# Patient Record
Sex: MALE | Race: WHITE | NOT HISPANIC OR LATINO | Employment: FULL TIME | URBAN - METROPOLITAN AREA
[De-identification: names, ages, dates, MRNs, and addresses within clinical notes are randomized per-mention and may not be internally consistent; named-entity substitution may affect disease eponyms.]

---

## 2017-06-13 ENCOUNTER — OFFICE VISIT (OUTPATIENT)
Dept: INTERNAL MEDICINE | Facility: CLINIC | Age: 22
End: 2017-06-13
Payer: COMMERCIAL

## 2017-06-13 ENCOUNTER — LAB VISIT (OUTPATIENT)
Dept: LAB | Facility: HOSPITAL | Age: 22
End: 2017-06-13
Attending: INTERNAL MEDICINE
Payer: COMMERCIAL

## 2017-06-13 VITALS
HEIGHT: 72 IN | DIASTOLIC BLOOD PRESSURE: 80 MMHG | OXYGEN SATURATION: 99 % | BODY MASS INDEX: 37.97 KG/M2 | HEART RATE: 78 BPM | SYSTOLIC BLOOD PRESSURE: 122 MMHG | TEMPERATURE: 99 F | WEIGHT: 280.31 LBS

## 2017-06-13 DIAGNOSIS — F51.01 PRIMARY INSOMNIA: Primary | ICD-10-CM

## 2017-06-13 DIAGNOSIS — F51.01 PRIMARY INSOMNIA: ICD-10-CM

## 2017-06-13 LAB
BASOPHILS # BLD AUTO: 0.03 K/UL
BASOPHILS NFR BLD: 0.4 %
DIFFERENTIAL METHOD: NORMAL
EOSINOPHIL # BLD AUTO: 0.1 K/UL
EOSINOPHIL NFR BLD: 1.3 %
ERYTHROCYTE [DISTWIDTH] IN BLOOD BY AUTOMATED COUNT: 12.6 %
GLUCOSE SERPL-MCNC: 84 MG/DL
HCT VFR BLD AUTO: 42.3 %
HGB BLD-MCNC: 14.1 G/DL
LYMPHOCYTES # BLD AUTO: 2.2 K/UL
LYMPHOCYTES NFR BLD: 26.2 %
MCH RBC QN AUTO: 30.3 PG
MCHC RBC AUTO-ENTMCNC: 33.3 %
MCV RBC AUTO: 91 FL
MONOCYTES # BLD AUTO: 0.7 K/UL
MONOCYTES NFR BLD: 8 %
NEUTROPHILS # BLD AUTO: 5.3 K/UL
NEUTROPHILS NFR BLD: 64.1 %
PLATELET # BLD AUTO: 189 K/UL
PMV BLD AUTO: 10.3 FL
RBC # BLD AUTO: 4.66 M/UL
T3 SERPL-MCNC: 118 NG/DL
T4 SERPL-MCNC: 5.5 UG/DL
TSH SERPL DL<=0.005 MIU/L-ACNC: 1.44 UIU/ML
WBC # BLD AUTO: 8.29 K/UL

## 2017-06-13 PROCEDURE — 84443 ASSAY THYROID STIM HORMONE: CPT

## 2017-06-13 PROCEDURE — 82947 ASSAY GLUCOSE BLOOD QUANT: CPT | Mod: PO

## 2017-06-13 PROCEDURE — 99203 OFFICE O/P NEW LOW 30 MIN: CPT | Mod: S$GLB,,, | Performed by: PHYSICIAN ASSISTANT

## 2017-06-13 PROCEDURE — 99999 PR PBB SHADOW E&M-EST. PATIENT-LVL III: CPT | Mod: PBBFAC,,, | Performed by: PHYSICIAN ASSISTANT

## 2017-06-13 PROCEDURE — 36415 COLL VENOUS BLD VENIPUNCTURE: CPT | Mod: PO

## 2017-06-13 PROCEDURE — 84436 ASSAY OF TOTAL THYROXINE: CPT

## 2017-06-13 PROCEDURE — 85025 COMPLETE CBC W/AUTO DIFF WBC: CPT | Mod: PO

## 2017-06-13 PROCEDURE — 84480 ASSAY TRIIODOTHYRONINE (T3): CPT

## 2017-06-13 RX ORDER — AMITRIPTYLINE HYDROCHLORIDE 10 MG/1
10 TABLET, FILM COATED ORAL NIGHTLY PRN
Qty: 20 TABLET | Refills: 0 | Status: SHIPPED | OUTPATIENT
Start: 2017-06-13 | End: 2017-07-10 | Stop reason: SDUPTHER

## 2017-06-13 NOTE — PROGRESS NOTES
Subjective:       Patient ID: Gamaliel Recinos is a 22 y.o.W/ male.    Chief Complaint: Insomnia    HPI         He comes in by himself today and has the above problem.  He is new to the practice and has never been here before.  He has been having trouble falling asleep at nighttime now for approximately a month.  He has a job that he has to get to by 7:30 AM.  He tries to get up at 5 AM by alarm clock.  He goes to bed at 9:00 at night.  Often times he just lays there for 1-4 hours before he can fall off to sleep.  He says his mind is racing.  He doesn't think of anyone thing in particular.  He denies any problems with financial, occupational, or family stress.  He still lives at home.  His brother just recently moved back into the home after having been out of state with his wife and child.  That has been little stress only.  There is no noise in the house when he is trying to fall asleep at 9:00 PM.  He was suspended for 2 days at work because he showed up late.  Apparently he's been showing up late a lot.        He has tried taking OTC melatonin and also NyQuil and sometimes Tylenol PM to help him get to sleep.  He does not take daytime naps.    Review of Systems    Otherwise negative concerning the NEUROPSYCHIATRIC, ENDOCRINE, ORTHOPEDIC, MUSCULOSKELETAL, ENT, RESPIRATORY, CV, and VASCULAR system review.    Objective:      Physical Exam    HEAD: NC/AT   He is alert, oriented ×3, cooperative and quite pleasant.  CN II through XII are intact and equal.  Gross motor and neurosensory functions are intact and equal.  ENT: All within normal limits.  He doesn't have any thyromegaly or tenderness.  His voice is normal.  CHEST: Clear BS anterior to posterior.  HEART: S1 is greater than S2.  Pulse rate is 76 bpm and regular.  He has no murmur or gallop.  There is no peripheral edema.  His color and warmth are all normal.  He is well hydrated.    Assessment:       1. Primary insomnia        Plan:     1.  Will order CBC, T3,  T4, TSH, and FBS.  2.  Trial of Elavil 10 mg daily at bedtime to help aid sleep.  Recheck in 3 weeks' time to determine efficacy.

## 2017-06-14 ENCOUNTER — TELEPHONE (OUTPATIENT)
Dept: INTERNAL MEDICINE | Facility: CLINIC | Age: 22
End: 2017-06-14

## 2017-06-14 NOTE — TELEPHONE ENCOUNTER
----- Message from Sangeeta Bradshaw sent at 6/14/2017  2:27 PM CDT -----  Contact: Pt  Pt request call from nurse to get results of labs done on 06-13-17, please contact pt at [phone]

## 2017-06-14 NOTE — TELEPHONE ENCOUNTER
Called and spoke with patient advised as soon as results are reviewed I will call him with them, he agrees to this

## 2017-07-10 ENCOUNTER — OFFICE VISIT (OUTPATIENT)
Dept: INTERNAL MEDICINE | Facility: CLINIC | Age: 22
End: 2017-07-10
Payer: COMMERCIAL

## 2017-07-10 VITALS
SYSTOLIC BLOOD PRESSURE: 128 MMHG | BODY MASS INDEX: 38.34 KG/M2 | TEMPERATURE: 99 F | DIASTOLIC BLOOD PRESSURE: 84 MMHG | HEART RATE: 96 BPM | HEIGHT: 72 IN | WEIGHT: 283.06 LBS | OXYGEN SATURATION: 98 %

## 2017-07-10 DIAGNOSIS — F51.01 PRIMARY INSOMNIA: ICD-10-CM

## 2017-07-10 PROBLEM — G47.00 INSOMNIA: Status: ACTIVE | Noted: 2017-07-10

## 2017-07-10 PROCEDURE — 99999 PR PBB SHADOW E&M-EST. PATIENT-LVL III: CPT | Mod: PBBFAC,,, | Performed by: PHYSICIAN ASSISTANT

## 2017-07-10 PROCEDURE — 99213 OFFICE O/P EST LOW 20 MIN: CPT | Mod: S$GLB,,, | Performed by: PHYSICIAN ASSISTANT

## 2017-07-10 RX ORDER — AMITRIPTYLINE HYDROCHLORIDE 10 MG/1
10 TABLET, FILM COATED ORAL NIGHTLY PRN
Qty: 30 TABLET | Refills: 2 | Status: SHIPPED | OUTPATIENT
Start: 2017-07-10

## 2017-07-10 NOTE — PROGRESS NOTES
Subjective:       Patient ID: Gamaliel Recinos is a 22 y.o.W/ male.    Chief Complaint: Follow-up (Sleeping)    HPI         He comes in by himself today and has the above need.  I saw him at the end of June with insomnia.  We placed him on Elavil 10 mg.  There have been sometimes in the past 3 weeks where he did not take the pill and he ended up having trouble falling off to sleep.  The other nights that he use the medicine, he got a good sleep and had no wake-up hangover affect.  He feels that he got restful sleep also.  He wants to continue using the medicine temporarily.    Review of Systems    Otherwise negative.    Objective:      Physical Exam    No exam today.    Assessment:       1. Primary insomnia        Plan:     1.  Refilled his Elavil with a 1 month supply and 2 refills.  Follow-up in about 10-12 weeks.